# Patient Record
Sex: MALE | Race: WHITE | NOT HISPANIC OR LATINO | Employment: OTHER | URBAN - METROPOLITAN AREA
[De-identification: names, ages, dates, MRNs, and addresses within clinical notes are randomized per-mention and may not be internally consistent; named-entity substitution may affect disease eponyms.]

---

## 2019-07-25 ENCOUNTER — OFFICE VISIT (OUTPATIENT)
Dept: PODIATRY | Facility: CLINIC | Age: 64
End: 2019-07-25
Payer: COMMERCIAL

## 2019-07-25 VITALS
RESPIRATION RATE: 18 BRPM | HEIGHT: 70 IN | SYSTOLIC BLOOD PRESSURE: 161 MMHG | OXYGEN SATURATION: 98 % | DIASTOLIC BLOOD PRESSURE: 91 MMHG | BODY MASS INDEX: 27.92 KG/M2 | TEMPERATURE: 98 F | HEART RATE: 66 BPM | WEIGHT: 195 LBS

## 2019-07-25 DIAGNOSIS — B35.1 ONYCHOMYCOSIS DUE TO DERMATOPHYTE: ICD-10-CM

## 2019-07-25 DIAGNOSIS — L60.8 ACQUIRED DYSMORPHIC TOENAIL: Primary | ICD-10-CM

## 2019-07-25 PROCEDURE — 99999 PR PBB SHADOW E&M-NEW PATIENT-LVL III: CPT | Mod: PBBFAC,,, | Performed by: PODIATRIST

## 2019-07-25 PROCEDURE — 99999 PR PBB SHADOW E&M-NEW PATIENT-LVL III: ICD-10-PCS | Mod: PBBFAC,,, | Performed by: PODIATRIST

## 2019-07-25 PROCEDURE — 99202 PR OFFICE/OUTPT VISIT, NEW, LEVL II, 15-29 MIN: ICD-10-PCS | Mod: S$GLB,,, | Performed by: PODIATRIST

## 2019-07-25 PROCEDURE — 99202 OFFICE O/P NEW SF 15 MIN: CPT | Mod: S$GLB,,, | Performed by: PODIATRIST

## 2019-07-28 NOTE — PROGRESS NOTES
Subjective:      Patient ID: Kade Ames is a 63 y.o. male.    Chief Complaint: Nail Problem (fungus)  Patient presents with concern regarding a problem with the right great toenail.  Reports changes to the nail over the last 2-3 years, denies history of injury.  Reports pain on occasion depending on shoes, the nail is very damaged and inquires about having it removed.   Patient denies any previous history of ingrown nail or infection. He has noticed thickening and discoloration of the 3rd digit nail left foot.   Patient  Is hard of hearing,  Denies any any medical or health issues, denies taking any prescription medications    ROS  Constitutional    Pleasant, well-nourished, no distress, well oriented, HARD OF HEARING     Cardiovascular          No chest pain, no shortness of breath,  no swelling in the extremities    Respiratory         No cough, no congestion     Musculoskeletal        No muscle aches, no arthralgias/joint pain, no back pain    Neurologic         No weakness          Objective:      Physical Exam   Cardiovascular:   Pulses:       Dorsalis pedis pulses are 2+ on the right side, and 2+ on the left side.        Posterior tibial pulses are 2+ on the right side, and 2+ on the left side.   Musculoskeletal:        Right foot: There is no deformity.        Left foot: There is no deformity.   Feet:   Right Foot:   Protective Sensation: 3 sites tested. 3 sites sensed.   Skin Integrity: Negative for erythema.   Left Foot:   Protective Sensation: 3 sites tested. 3 sites sensed.   Skin Integrity: Negative for erythema.   Vascular         Normal CFT bilateral   No lower extremity edema bilateral   Pedal skin temperature and color are normal bilateral     Integumentary   Right hallux nail is severely dystrophic, irregular, evidence of permanent damage to the nail bed.  Nail is short but thickened raised, discolored, partially detached.  Thickness reduced, no evidence of  subungual abscess or ingrown nail  No  pain upon palpation, no surrounding erythema or calor  Yellow, thick, fungal nail 3rd digit left foot, no pain, no sign of infection  Other nails are healthy, pink, clear  Skin is in good condition bilateral feet          Neurological   Gross sensation intact, no paresthesias bilateral feet     Musculoskeletal   Muscle Strength/Testing and Tone:  Intact, normal tone bilateral   Joints, Bones, and Muscles: Normal for age        Walks well unassisted        Presents in appropriate shoes        Assessment:       Encounter Diagnoses   Name Primary?    Acquired dysmorphic toenail Yes    Onychomycosis due to dermatophyte          Plan:       Kade was seen today for nail problem.    Diagnoses and all orders for this visit:    Acquired dysmorphic toenail    Onychomycosis due to dermatophyte      Explained to nail patient I would not recommend removing nail, it can cause further damage to the nail bed and most likely will grow in the same way if not worse.  Explained the only time I recommend removing partial or total nail is if it is ingrown and infected.  Discussed maintenance of nail, reducing thickness, keep nails short and thin.  Recommended daily treatment with either Vicks vapor rub or tea tree oil.  Soak once a week in warm water and Epson salt. Explained these topicals have been successful since they are absorbed into the nail which softens the nail to recruit leave pressure and pain.   Explained the patient topical treatment takes about 3 months to notice improvement, can take 12 months to grow out a full nail plate.  We discussed permanent damage of the nail bed, unlikely nail will grow in any better but these treatments can be very beneficial for reducing pain and prevent any ingrown nails and potential complications due to damage nail   We discussed signs of infection to monitor for and conservative treatments to start should an area become red, swollen or painful.  Advised any area which has not improved in  2-3 days should be seen for follow-up in the office.  Patient was in understanding and agreement with treatment plan  I counseled the patient on his conditions, their implications and medical management.  Instructed patient to contact the office with any changes, questions, concerns, worsening of symptoms. Patient verbalized understanding.   Total face to face time, exam, assessment, treatment, discussion, documentation 20 minutes, more than half this time spent on consultation and coordination of care.   Follow up as needed      This note was created using [employer] voice recognition software that occasionally misinterpreted phrases or words.

## 2020-10-20 ENCOUNTER — OFFICE VISIT (OUTPATIENT)
Dept: PODIATRY | Facility: CLINIC | Age: 65
End: 2020-10-20
Payer: COMMERCIAL

## 2020-10-20 VITALS
TEMPERATURE: 98 F | HEART RATE: 82 BPM | HEIGHT: 70 IN | OXYGEN SATURATION: 99 % | WEIGHT: 195 LBS | DIASTOLIC BLOOD PRESSURE: 81 MMHG | RESPIRATION RATE: 19 BRPM | SYSTOLIC BLOOD PRESSURE: 189 MMHG | BODY MASS INDEX: 27.92 KG/M2

## 2020-10-20 DIAGNOSIS — L60.8 ACQUIRED DYSMORPHIC TOENAIL: ICD-10-CM

## 2020-10-20 DIAGNOSIS — B35.1 ONYCHOMYCOSIS OF TOENAIL: ICD-10-CM

## 2020-10-20 DIAGNOSIS — Z79.899 HIGH RISK MEDICATION USE: ICD-10-CM

## 2020-10-20 DIAGNOSIS — M79.674 PAIN OF RIGHT GREAT TOE: Primary | ICD-10-CM

## 2020-10-20 PROCEDURE — 99999 PR PBB SHADOW E&M-EST. PATIENT-LVL III: CPT | Mod: PBBFAC,,, | Performed by: PODIATRIST

## 2020-10-20 PROCEDURE — 99214 PR OFFICE/OUTPT VISIT, EST, LEVL IV, 30-39 MIN: ICD-10-PCS | Mod: S$GLB,,, | Performed by: PODIATRIST

## 2020-10-20 PROCEDURE — 99214 OFFICE O/P EST MOD 30 MIN: CPT | Mod: S$GLB,,, | Performed by: PODIATRIST

## 2020-10-20 PROCEDURE — 99999 PR PBB SHADOW E&M-EST. PATIENT-LVL III: ICD-10-PCS | Mod: PBBFAC,,, | Performed by: PODIATRIST

## 2020-10-20 RX ORDER — TERAZOSIN 1 MG/1
CAPSULE ORAL
COMMUNITY
Start: 2020-10-05 | End: 2021-04-29

## 2020-10-20 RX ORDER — CLOTRIMAZOLE 1 G/ML
SOLUTION TOPICAL 2 TIMES DAILY
Qty: 30 BOTTLE | Refills: 1 | Status: SHIPPED | OUTPATIENT
Start: 2020-10-20 | End: 2021-08-11 | Stop reason: SDUPTHER

## 2020-10-20 RX ORDER — ASPIRIN 325 MG
325 TABLET ORAL DAILY
COMMUNITY

## 2020-10-20 RX ORDER — PRAVASTATIN SODIUM 40 MG/1
40 TABLET ORAL DAILY
COMMUNITY
Start: 2020-07-30 | End: 2021-04-29

## 2020-10-20 RX ORDER — CARVEDILOL 3.12 MG/1
3.12 TABLET ORAL DAILY
COMMUNITY
Start: 2020-10-06 | End: 2023-01-19 | Stop reason: SDUPTHER

## 2020-10-20 NOTE — LETTER
October 20, 2020      Donn Rodriges MD  [address]  [place] [address]           [facility]  [address] [facility] [place] [address]  Phone: [phone]  Fax: [phone]          Patient: Kade Ames   MR Number: [id number]   YOB: 1955   Date of Visit: 10/20/2020       Dear Dr. Donn Rodriges:    Thank you for referring Kade Ames to me for evaluation. Attached you will find relevant portions of my assessment and plan of care.    If you have questions, please do not hesitate to call me. I look forward to following Kade Ames along with you.    Sincerely,    Sonal Stone, JJ    Enclosure  CC:  No Recipients    If you would like to receive this communication electronically, please contact [email or web address] or [phone] to request more information on [employer] Link access.    For providers and/or their staff who would like to refer a patient to [facility], please contact us through our one-stop-shop provider referral line, [facility], at [phone].    If you feel you have received this communication in error or would no longer like to receive these types of communications, please e-mail [email or web address]

## 2020-10-21 NOTE — PROGRESS NOTES
Subjective:       Patient ID: Kade Ames is a 65 y.o. male.    Chief Complaint: Toe Pain  Patient presents with his son with complaint of pain right great toe.  Patient is deaf, reads lips.  His son removed his mask so he could relay information back to patient from myself.  We last saw patient over a year ago for painful nail right great toe.  Patient states over-the-counter topicals have not helped.  At this point the toe is so painful at times he finds himself walking differently to avoid pressure in this location.   States he is unable to trim it properly, he is not currently applying anything to the nail, has some old oral Lamisil left and inquires if he should take it.  Has similar condition 3rd digit nail left foot with no pain      Past Medical History:   Diagnosis Date    Deaf     reads lips    Diabetes mellitus, type 2      History reviewed. No pertinent surgical history.  History reviewed. No pertinent family history.  Social History     Socioeconomic History    Marital status:      Spouse name: Not on file    Number of children: Not on file    Years of education: Not on file    Highest education level: Not on file   Occupational History    Not on file   Social Needs    Financial resource strain: Not on file    Food insecurity     Worry: Not on file     Inability: Not on file    Transportation needs     Medical: Not on file     Non-medical: Not on file   Tobacco Use    Smoking status: Never Smoker    Smokeless tobacco: Never Used   Substance and Sexual Activity    Alcohol use: Never     Frequency: Never    Drug use: Never    Sexual activity: Not Currently   Lifestyle    Physical activity     Days per week: Not on file     Minutes per session: Not on file    Stress: Not on file   Relationships    Social connections     Talks on phone: Not on file     Gets together: Not on file     Attends Zoroastrian service: Not on file     Active member of club or organization: Not on file      "Attends meetings of clubs or organizations: Not on file     Relationship status: Not on file   Other Topics Concern    Not on file   Social History Narrative    Not on file       Current Outpatient Medications   Medication Sig Dispense Refill    aspirin 325 MG tablet Take 325 mg by mouth once daily.      carvediloL (COREG) 3.125 MG tablet Take 3.125 mg by mouth once daily.      pravastatin (PRAVACHOL) 40 MG tablet Take 40 mg by mouth once daily.      terazosin (HYTRIN) 1 MG capsule TAKE 1 CAPSULE BY MOUTH EVERY DAY IN THE EVENING      clotrimazole (LOTRIMIN) 1 % Soln Apply topically 2 (two) times daily. 30 Bottle 1     No current facility-administered medications for this visit.      Review of patient's allergies indicates:  No Known Allergies    Review of Systems   Constitutional: Negative for fever.   HENT: Negative for congestion.    Respiratory: Negative for cough.    Cardiovascular: Negative for leg swelling.   Musculoskeletal: Negative for gait problem.   All other systems reviewed and are negative.      Objective:      Vitals:    10/20/20 1022   BP: (!) 189/81   Pulse: 82   Resp: 19   Temp: 97.8 °F (36.6 °C)   TempSrc: Temporal   SpO2: 99%   Weight: 88.5 kg (195 lb)   Height: 5' 10" (1.778 m)     Physical Exam  Vitals signs and nursing note reviewed.   Constitutional:       General: He is not in acute distress.  Cardiovascular:      Pulses:           Dorsalis pedis pulses are 2+ on the right side and 2+ on the left side.        Posterior tibial pulses are 2+ on the right side and 2+ on the left side.   Pulmonary:      Effort: Pulmonary effort is normal.   Feet:      Right foot:      Skin integrity: No erythema.      Toenail Condition: Right toenails are abnormally thick. Fungal disease present.     Left foot:      Skin integrity: No erythema.      Toenail Condition: Fungal disease present.  Skin:     Capillary Refill: Capillary refill takes 2 to 3 seconds.   Neurological:      General: No focal deficit " present.   Psychiatric:         Mood and Affect: Mood normal.         Behavior: Behavior normal.       Vascular         Normal CFT bilateral   No lower extremity edema bilateral   Pedal skin temperature and color are normal bilateral     Integumentary   Tender, raised, tented, discolored and very dystrophic onychomycotic right hallux nail.  There is no evidence of ingrown nail upon reducing thickness of this nail plate.  No edema, erythema or calor.        Onychomycosis 3rd digit nail left foot, no pain  All other nails are clear, flat, healthy with no pain  Soft skin texture with no complications    Neurological   Gross sensation intact, no paresthesias bilateral feet     Musculoskeletal   Muscle Strength/Testing and Tone:  Intact, normal tone bilateral   Joints, Bones, and Muscles: Normal with normal ROM        Walks well unassisted        Presents in appropriate shoes       Assessment:       1. Pain of right great toe    2. Acquired dysmorphic toenail - Right Foot    3. Onychomycosis of toenail    4. High risk medication use        Plan:           CLOTRIMAZOLE TOPICAL SOLUTION APPLY TWICE DAILY TO AFFECTED NAILS    HEPATIC FUNCTION FUNCTION PANEL, EXTERNAL SITE      Had a lengthy discussion with patient about possibilities on how he acquired too damaged, fungal nails on all the rest are healthy.  Explained the patient typically occurs due to trauma to the nail / nail plate/toe or toenail.  Advised patient is not always an event which is recollected, it can occur slowly over time.  Explained at the very least this nail needs to be better maintained, thickness reduced on a regular basis, and topical medication used.  Right hallux nail was debrided significantly.  Due to tented nail in the center, small spot blood was noted.  Remaining portion of the nail was reduced significantly to thin the nail plate.  Area cleansed with peroxide, triple antibiotic ointment, gauze and Coban applied.  Instructed patient to clean  nail once daily with peroxide, no antibiotic ointment or bandages needed unless the area is sore.  Contact the office if any redness, swelling or pain develops.  At the end of one-week the area should be completely healed and he can start applying prescribed topical antifungal medication.  It is to be applied twice daily.  Instructed patient to soak few times weekly and warm water and Epson salt, at least once a week reduce thickness of nail with a file or emery board to prevent buildup, avoid pain, and while medication to penetrate more effectively  Patient does want to pursue oral Lamisil.  We discussed this medication at length,  expectations regarding a healthy nail which may not be possible if there is permanent damage, and we discussed metabolism by the liver therefore need for blood work.  Patient requested order for blood work so he could have this test performed in addition to other blood work he will be having through his PCP in the next week or 2  Instructed patient to contact office with any questions or concerns  Patient/family were in understanding and agreement with treatment plan.  I counseled the patient on their conditions, implications and medical management.  Instructed patient/family to contact the office with any changes, questions, concerns, worsening of symptoms.   Total face to face time, exam, assessment, treatment, discussion, documentation 25 minutes, more than half this time spent on consultation and coordination of care.   Follow up 3 months    This note was created using [employer] voice recognition software that occasionally misinterpreted phrases or words.

## 2021-01-26 ENCOUNTER — OFFICE VISIT (OUTPATIENT)
Dept: PODIATRY | Facility: CLINIC | Age: 66
End: 2021-01-26
Payer: COMMERCIAL

## 2021-01-26 VITALS
WEIGHT: 195 LBS | BODY MASS INDEX: 27.92 KG/M2 | HEIGHT: 70 IN | HEART RATE: 100 BPM | TEMPERATURE: 98 F | SYSTOLIC BLOOD PRESSURE: 189 MMHG | OXYGEN SATURATION: 96 % | DIASTOLIC BLOOD PRESSURE: 96 MMHG

## 2021-01-26 DIAGNOSIS — Z79.899 HIGH RISK MEDICATION USE: ICD-10-CM

## 2021-01-26 DIAGNOSIS — B35.1 ONYCHOMYCOSIS OF TOENAIL: ICD-10-CM

## 2021-01-26 DIAGNOSIS — L60.8 ACQUIRED DYSMORPHIC TOENAIL: ICD-10-CM

## 2021-01-26 DIAGNOSIS — M79.674 PAIN OF RIGHT GREAT TOE: Primary | ICD-10-CM

## 2021-01-26 PROCEDURE — 99214 PR OFFICE/OUTPT VISIT, EST, LEVL IV, 30-39 MIN: ICD-10-PCS | Mod: S$GLB,,, | Performed by: PODIATRIST

## 2021-01-26 PROCEDURE — 99214 OFFICE O/P EST MOD 30 MIN: CPT | Mod: S$GLB,,, | Performed by: PODIATRIST

## 2021-01-26 PROCEDURE — 99999 PR PBB SHADOW E&M-EST. PATIENT-LVL III: CPT | Mod: PBBFAC,,, | Performed by: PODIATRIST

## 2021-01-26 PROCEDURE — 99999 PR PBB SHADOW E&M-EST. PATIENT-LVL III: ICD-10-PCS | Mod: PBBFAC,,, | Performed by: PODIATRIST

## 2021-01-26 RX ORDER — CEPHALEXIN 250 MG/1
1 CAPSULE ORAL 2 TIMES DAILY
COMMUNITY
Start: 2021-01-04 | End: 2023-01-18

## 2021-02-01 ENCOUNTER — TELEPHONE (OUTPATIENT)
Dept: PODIATRY | Facility: CLINIC | Age: 66
End: 2021-02-01

## 2021-02-04 ENCOUNTER — TELEPHONE (OUTPATIENT)
Dept: PODIATRY | Facility: CLINIC | Age: 66
End: 2021-02-04

## 2021-02-05 ENCOUNTER — TELEPHONE (OUTPATIENT)
Dept: PODIATRY | Facility: CLINIC | Age: 66
End: 2021-02-05

## 2021-03-02 ENCOUNTER — TELEPHONE (OUTPATIENT)
Dept: PODIATRY | Facility: CLINIC | Age: 66
End: 2021-03-02

## 2021-03-04 DIAGNOSIS — Z79.899 HIGH RISK MEDICATION USE: ICD-10-CM

## 2021-03-04 DIAGNOSIS — M79.674 PAIN OF RIGHT GREAT TOE: ICD-10-CM

## 2021-03-04 DIAGNOSIS — B35.1 ONYCHOMYCOSIS DUE TO DERMATOPHYTE: ICD-10-CM

## 2021-03-04 DIAGNOSIS — L60.8 ACQUIRED DYSMORPHIC TOENAIL: Primary | ICD-10-CM

## 2021-03-04 RX ORDER — TERBINAFINE HYDROCHLORIDE 250 MG/1
250 TABLET ORAL DAILY
Qty: 30 TABLET | Refills: 1 | Status: SHIPPED | OUTPATIENT
Start: 2021-03-04 | End: 2021-04-18

## 2021-03-09 ENCOUNTER — OFFICE VISIT (OUTPATIENT)
Dept: FAMILY MEDICINE | Facility: CLINIC | Age: 66
End: 2021-03-09
Payer: MEDICARE

## 2021-03-09 VITALS
WEIGHT: 216 LBS | DIASTOLIC BLOOD PRESSURE: 90 MMHG | OXYGEN SATURATION: 95 % | HEART RATE: 74 BPM | BODY MASS INDEX: 30.92 KG/M2 | SYSTOLIC BLOOD PRESSURE: 142 MMHG | HEIGHT: 70 IN | TEMPERATURE: 100 F

## 2021-03-09 DIAGNOSIS — B35.1 TOENAIL FUNGUS: ICD-10-CM

## 2021-03-09 DIAGNOSIS — R29.740: ICD-10-CM

## 2021-03-09 DIAGNOSIS — H91.90 DEAFNESS, UNSPECIFIED LATERALITY: ICD-10-CM

## 2021-03-09 DIAGNOSIS — I10 ESSENTIAL HYPERTENSION, BENIGN: ICD-10-CM

## 2021-03-09 DIAGNOSIS — K21.9 GASTROESOPHAGEAL REFLUX DISEASE WITHOUT ESOPHAGITIS: ICD-10-CM

## 2021-03-09 DIAGNOSIS — E78.5 HYPERLIPIDEMIA, UNSPECIFIED HYPERLIPIDEMIA TYPE: Primary | ICD-10-CM

## 2021-03-09 PROCEDURE — 99214 OFFICE O/P EST MOD 30 MIN: CPT | Mod: S$GLB,,, | Performed by: FAMILY MEDICINE

## 2021-03-09 PROCEDURE — 99214 PR OFFICE/OUTPT VISIT, EST, LEVL IV, 30-39 MIN: ICD-10-PCS | Mod: S$GLB,,, | Performed by: FAMILY MEDICINE

## 2021-04-29 ENCOUNTER — OFFICE VISIT (OUTPATIENT)
Dept: PODIATRY | Facility: CLINIC | Age: 66
End: 2021-04-29
Payer: COMMERCIAL

## 2021-04-29 VITALS
HEART RATE: 72 BPM | SYSTOLIC BLOOD PRESSURE: 154 MMHG | DIASTOLIC BLOOD PRESSURE: 87 MMHG | HEIGHT: 70 IN | WEIGHT: 205 LBS | RESPIRATION RATE: 18 BRPM | TEMPERATURE: 98 F | BODY MASS INDEX: 29.35 KG/M2

## 2021-04-29 DIAGNOSIS — B35.1 ONYCHOMYCOSIS OF TOENAIL: ICD-10-CM

## 2021-04-29 DIAGNOSIS — L60.8 ACQUIRED DYSMORPHIC TOENAIL: ICD-10-CM

## 2021-04-29 DIAGNOSIS — M79.674 PAIN OF RIGHT GREAT TOE: Primary | ICD-10-CM

## 2021-04-29 PROCEDURE — 99213 PR OFFICE/OUTPT VISIT, EST, LEVL III, 20-29 MIN: ICD-10-PCS | Mod: S$GLB,,, | Performed by: PODIATRIST

## 2021-04-29 PROCEDURE — 99213 OFFICE O/P EST LOW 20 MIN: CPT | Mod: S$GLB,,, | Performed by: PODIATRIST

## 2021-04-29 PROCEDURE — 99999 PR PBB SHADOW E&M-EST. PATIENT-LVL IV: ICD-10-PCS | Mod: PBBFAC,,, | Performed by: PODIATRIST

## 2021-04-29 PROCEDURE — 99999 PR PBB SHADOW E&M-EST. PATIENT-LVL IV: CPT | Mod: PBBFAC,,, | Performed by: PODIATRIST

## 2021-04-29 RX ORDER — TERBINAFINE HYDROCHLORIDE 250 MG/1
250 TABLET ORAL DAILY
Qty: 30 TABLET | Refills: 0 | Status: SHIPPED | OUTPATIENT
Start: 2021-04-29 | End: 2021-05-29

## 2021-04-29 RX ORDER — TERBINAFINE HYDROCHLORIDE 250 MG/1
250 TABLET ORAL DAILY
COMMUNITY
End: 2021-04-29 | Stop reason: SDUPTHER

## 2021-05-05 DIAGNOSIS — Z11.59 NEED FOR HEPATITIS C SCREENING TEST: ICD-10-CM

## 2021-05-06 ENCOUNTER — TELEPHONE (OUTPATIENT)
Dept: PODIATRY | Facility: CLINIC | Age: 66
End: 2021-05-06

## 2021-05-06 ENCOUNTER — OFFICE VISIT (OUTPATIENT)
Dept: PODIATRY | Facility: CLINIC | Age: 66
End: 2021-05-06
Payer: COMMERCIAL

## 2021-05-06 VITALS
SYSTOLIC BLOOD PRESSURE: 156 MMHG | BODY MASS INDEX: 29.35 KG/M2 | TEMPERATURE: 97 F | HEART RATE: 81 BPM | DIASTOLIC BLOOD PRESSURE: 89 MMHG | HEIGHT: 70 IN | WEIGHT: 205 LBS

## 2021-05-06 DIAGNOSIS — L60.8 ACQUIRED DYSMORPHIC TOENAIL: Primary | ICD-10-CM

## 2021-05-06 DIAGNOSIS — B35.1 ONYCHOMYCOSIS DUE TO DERMATOPHYTE: ICD-10-CM

## 2021-05-06 DIAGNOSIS — M79.674 PAIN OF RIGHT GREAT TOE: ICD-10-CM

## 2021-05-06 DIAGNOSIS — B35.1 ONYCHOMYCOSIS OF TOENAIL: ICD-10-CM

## 2021-05-06 PROCEDURE — 11730 NAIL REMOVAL: ICD-10-PCS | Mod: T5,S$GLB,, | Performed by: PODIATRIST

## 2021-05-06 PROCEDURE — 99999 PR PBB SHADOW E&M-EST. PATIENT-LVL III: ICD-10-PCS | Mod: PBBFAC,,, | Performed by: PODIATRIST

## 2021-05-06 PROCEDURE — 99999 PR PBB SHADOW E&M-EST. PATIENT-LVL III: CPT | Mod: PBBFAC,,, | Performed by: PODIATRIST

## 2021-05-06 PROCEDURE — 99213 OFFICE O/P EST LOW 20 MIN: CPT | Mod: 25,S$GLB,, | Performed by: PODIATRIST

## 2021-05-06 PROCEDURE — 11730 AVULSION NAIL PLATE SIMPLE 1: CPT | Mod: T5,S$GLB,, | Performed by: PODIATRIST

## 2021-05-06 PROCEDURE — 99213 PR OFFICE/OUTPT VISIT, EST, LEVL III, 20-29 MIN: ICD-10-PCS | Mod: 25,S$GLB,, | Performed by: PODIATRIST

## 2021-05-06 RX ORDER — HYDROCODONE BITARTRATE AND ACETAMINOPHEN 7.5; 325 MG/1; MG/1
1 TABLET ORAL EVERY 6 HOURS PRN
Qty: 12 TABLET | Refills: 0 | Status: SHIPPED | OUTPATIENT
Start: 2021-05-06 | End: 2021-05-09

## 2021-05-06 RX ORDER — HYDROCODONE BITARTRATE AND ACETAMINOPHEN 7.5; 325 MG/1; MG/1
1 TABLET ORAL EVERY 6 HOURS PRN
Qty: 12 TABLET | Refills: 0 | Status: SHIPPED | OUTPATIENT
Start: 2021-05-06 | End: 2021-05-06 | Stop reason: SDUPTHER

## 2021-05-12 ENCOUNTER — OFFICE VISIT (OUTPATIENT)
Dept: PODIATRY | Facility: CLINIC | Age: 66
End: 2021-05-12
Payer: COMMERCIAL

## 2021-05-12 VITALS
DIASTOLIC BLOOD PRESSURE: 90 MMHG | RESPIRATION RATE: 16 BRPM | BODY MASS INDEX: 29.94 KG/M2 | WEIGHT: 209.13 LBS | SYSTOLIC BLOOD PRESSURE: 150 MMHG | HEIGHT: 70 IN

## 2021-05-12 DIAGNOSIS — M20.5X1 HALLUX LIMITUS OF RIGHT FOOT: ICD-10-CM

## 2021-05-12 DIAGNOSIS — L60.8 ACQUIRED DYSMORPHIC TOENAIL: Primary | ICD-10-CM

## 2021-05-12 PROCEDURE — 99213 PR OFFICE/OUTPT VISIT, EST, LEVL III, 20-29 MIN: ICD-10-PCS | Mod: S$GLB,,, | Performed by: PODIATRIST

## 2021-05-12 PROCEDURE — 99999 PR PBB SHADOW E&M-EST. PATIENT-LVL III: CPT | Mod: PBBFAC,,, | Performed by: PODIATRIST

## 2021-05-12 PROCEDURE — 99213 OFFICE O/P EST LOW 20 MIN: CPT | Mod: S$GLB,,, | Performed by: PODIATRIST

## 2021-05-12 PROCEDURE — 99999 PR PBB SHADOW E&M-EST. PATIENT-LVL III: ICD-10-PCS | Mod: PBBFAC,,, | Performed by: PODIATRIST

## 2021-06-09 ENCOUNTER — OFFICE VISIT (OUTPATIENT)
Dept: PODIATRY | Facility: CLINIC | Age: 66
End: 2021-06-09
Payer: COMMERCIAL

## 2021-06-09 VITALS
BODY MASS INDEX: 29.76 KG/M2 | SYSTOLIC BLOOD PRESSURE: 156 MMHG | HEIGHT: 70 IN | OXYGEN SATURATION: 97 % | RESPIRATION RATE: 16 BRPM | WEIGHT: 207.88 LBS | HEART RATE: 76 BPM | DIASTOLIC BLOOD PRESSURE: 88 MMHG

## 2021-06-09 DIAGNOSIS — L60.8 ACQUIRED DYSMORPHIC TOENAIL: Primary | ICD-10-CM

## 2021-06-09 PROCEDURE — 99999 PR PBB SHADOW E&M-EST. PATIENT-LVL IV: CPT | Mod: PBBFAC,,, | Performed by: PODIATRIST

## 2021-06-09 PROCEDURE — 99213 PR OFFICE/OUTPT VISIT, EST, LEVL III, 20-29 MIN: ICD-10-PCS | Mod: S$GLB,,, | Performed by: PODIATRIST

## 2021-06-09 PROCEDURE — 99213 OFFICE O/P EST LOW 20 MIN: CPT | Mod: S$GLB,,, | Performed by: PODIATRIST

## 2021-06-09 PROCEDURE — 99999 PR PBB SHADOW E&M-EST. PATIENT-LVL IV: ICD-10-PCS | Mod: PBBFAC,,, | Performed by: PODIATRIST

## 2021-08-11 ENCOUNTER — OFFICE VISIT (OUTPATIENT)
Dept: PODIATRY | Facility: CLINIC | Age: 66
End: 2021-08-11
Payer: COMMERCIAL

## 2021-08-11 VITALS
WEIGHT: 207.88 LBS | RESPIRATION RATE: 19 BRPM | SYSTOLIC BLOOD PRESSURE: 188 MMHG | BODY MASS INDEX: 29.76 KG/M2 | HEIGHT: 70 IN | OXYGEN SATURATION: 97 % | HEART RATE: 70 BPM | DIASTOLIC BLOOD PRESSURE: 97 MMHG

## 2021-08-11 DIAGNOSIS — B35.1 ONYCHOMYCOSIS OF TOENAIL: ICD-10-CM

## 2021-08-11 DIAGNOSIS — L60.8 ACQUIRED DYSMORPHIC TOENAIL: Primary | ICD-10-CM

## 2021-08-11 PROCEDURE — 99999 PR PBB SHADOW E&M-EST. PATIENT-LVL IV: CPT | Mod: PBBFAC,,, | Performed by: PODIATRIST

## 2021-08-11 PROCEDURE — 99213 OFFICE O/P EST LOW 20 MIN: CPT | Mod: S$GLB,,, | Performed by: PODIATRIST

## 2021-08-11 PROCEDURE — 99999 PR PBB SHADOW E&M-EST. PATIENT-LVL IV: ICD-10-PCS | Mod: PBBFAC,,, | Performed by: PODIATRIST

## 2021-08-11 PROCEDURE — 99213 PR OFFICE/OUTPT VISIT, EST, LEVL III, 20-29 MIN: ICD-10-PCS | Mod: S$GLB,,, | Performed by: PODIATRIST

## 2021-08-11 RX ORDER — CLOTRIMAZOLE 1 G/ML
SOLUTION TOPICAL 2 TIMES DAILY
Qty: 30 BOTTLE | Refills: 1 | Status: SHIPPED | OUTPATIENT
Start: 2021-08-11 | End: 2021-08-11 | Stop reason: SDUPTHER

## 2021-08-11 RX ORDER — CLOTRIMAZOLE 1 G/ML
SOLUTION TOPICAL 2 TIMES DAILY
Qty: 30 BOTTLE | Refills: 1 | Status: SHIPPED | OUTPATIENT
Start: 2021-08-11 | End: 2023-01-18

## 2021-12-02 DIAGNOSIS — Z12.11 COLON CANCER SCREENING: ICD-10-CM

## 2022-04-21 ENCOUNTER — TELEPHONE (OUTPATIENT)
Dept: FAMILY MEDICINE | Facility: CLINIC | Age: 67
End: 2022-04-21
Payer: COMMERCIAL

## 2022-05-31 ENCOUNTER — PATIENT MESSAGE (OUTPATIENT)
Dept: ADMINISTRATIVE | Facility: HOSPITAL | Age: 67
End: 2022-05-31
Payer: COMMERCIAL

## 2022-06-02 ENCOUNTER — TELEPHONE (OUTPATIENT)
Dept: FAMILY MEDICINE | Facility: CLINIC | Age: 67
End: 2022-06-02
Payer: COMMERCIAL

## 2023-01-06 ENCOUNTER — OFFICE VISIT (OUTPATIENT)
Dept: FAMILY MEDICINE | Facility: CLINIC | Age: 68
End: 2023-01-06
Payer: COMMERCIAL

## 2023-01-06 VITALS
SYSTOLIC BLOOD PRESSURE: 172 MMHG | RESPIRATION RATE: 20 BRPM | BODY MASS INDEX: 31.35 KG/M2 | HEART RATE: 88 BPM | WEIGHT: 219 LBS | OXYGEN SATURATION: 97 % | DIASTOLIC BLOOD PRESSURE: 92 MMHG | TEMPERATURE: 98 F | HEIGHT: 70 IN

## 2023-01-06 DIAGNOSIS — H91.90 DEAFNESS, UNSPECIFIED LATERALITY: ICD-10-CM

## 2023-01-06 DIAGNOSIS — H65.193 OTHER ACUTE NONSUPPURATIVE OTITIS MEDIA OF BOTH EARS, RECURRENCE NOT SPECIFIED: ICD-10-CM

## 2023-01-06 DIAGNOSIS — H65.03 OTITIS MEDIA, SEROUS, ACUTE, WITHOUT RUPTURE, BILATERAL: ICD-10-CM

## 2023-01-06 DIAGNOSIS — M19.90 OSTEOARTHRITIS, UNSPECIFIED OSTEOARTHRITIS TYPE, UNSPECIFIED SITE: Primary | ICD-10-CM

## 2023-01-06 DIAGNOSIS — I10 HYPERTENSION, UNSPECIFIED TYPE: ICD-10-CM

## 2023-01-06 PROCEDURE — 99214 PR OFFICE/OUTPT VISIT, EST, LEVL IV, 30-39 MIN: ICD-10-PCS | Mod: S$GLB,,, | Performed by: NURSE PRACTITIONER

## 2023-01-06 PROCEDURE — 99214 OFFICE O/P EST MOD 30 MIN: CPT | Mod: S$GLB,,, | Performed by: NURSE PRACTITIONER

## 2023-01-06 RX ORDER — MOMETASONE FUROATE 50 UG/1
2 SPRAY, METERED NASAL DAILY
Qty: 17 G | Refills: 3 | Status: SHIPPED | OUTPATIENT
Start: 2023-01-06 | End: 2023-08-23

## 2023-01-06 RX ORDER — LOSARTAN POTASSIUM 25 MG/1
25 TABLET ORAL 2 TIMES DAILY
Qty: 180 TABLET | Refills: 3 | Status: SHIPPED | OUTPATIENT
Start: 2023-01-06 | End: 2024-01-06

## 2023-01-06 RX ORDER — NEOMYCIN SULFATE, POLYMYXIN B SULFATE AND HYDROCORTISONE 10; 3.5; 1 MG/ML; MG/ML; [USP'U]/ML
3 SUSPENSION/ DROPS AURICULAR (OTIC) 3 TIMES DAILY
Qty: 10 ML | Refills: 1 | Status: SHIPPED | OUTPATIENT
Start: 2023-01-06 | End: 2023-01-19

## 2023-01-06 RX ORDER — DICLOFENAC SODIUM 10 MG/G
2 GEL TOPICAL 2 TIMES DAILY PRN
Qty: 50 G | Refills: 5 | Status: SHIPPED | OUTPATIENT
Start: 2023-01-06

## 2023-01-06 NOTE — PROGRESS NOTES
Subjective:    Patient ID: Kade is a 67 y.o. male.  Chief Complaint: Kade had concerns including Otalgia (R ear decrease hearing) and Arthritis (Bilateral Hands).   Narrative:   Mr. Ames presents for evaluation of elevated BP, ear discomfort. He wears hearing aids regularly, has some canal discomfort  Requests topical treatment for arthritis of the hands    Otalgia     Arthritis    All other systems negative except as stated above.        Review of patient's allergies indicates:  No Known Allergies  Objective:      Vitals:    01/06/23 1513   BP: (!) 172/92   Pulse:    Resp:    Temp:      -WEIGHT  Body mass index is 31.42 kg/m².  Physical Exam  Vitals and nursing note reviewed.   Constitutional:       Appearance: Normal appearance.   HENT:      Head: Normocephalic and atraumatic.      Ears:      Comments: Bilateral serous effusions,   Mild erythema of the auditory canals, without swelling.     Nose: Nose normal.      Mouth/Throat:      Mouth: Mucous membranes are moist.      Pharynx: Oropharynx is clear.   Eyes:      Conjunctiva/sclera: Conjunctivae normal.      Pupils: Pupils are equal, round, and reactive to light.   Cardiovascular:      Rate and Rhythm: Normal rate and regular rhythm.      Pulses: Normal pulses.      Heart sounds: Normal heart sounds.   Pulmonary:      Effort: Pulmonary effort is normal.      Breath sounds: Normal breath sounds.   Abdominal:      General: Abdomen is flat. Bowel sounds are normal.      Palpations: Abdomen is soft.   Musculoskeletal:      Cervical back: Normal range of motion and neck supple.      Comments: Has some mild arthritic changes of the hands, reports stiffness in am of distal fingers   Skin:     General: Skin is warm.      Capillary Refill: Capillary refill takes less than 2 seconds.   Neurological:      General: No focal deficit present.      Mental Status: He is alert.   Psychiatric:         Mood and Affect: Mood normal.         Behavior: Behavior normal.          Assessment:       1. Osteoarthritis, unspecified osteoarthritis type, unspecified site    2. Deafness, unspecified laterality    3. Hypertension, unspecified type    4. Otitis media, serous, acute, without rupture, bilateral    5. Other acute nonsuppurative otitis media of both ears, recurrence not specified          Plan:       Osteoarthritis, unspecified osteoarthritis type, unspecified site  -     diclofenac sodium (VOLTAREN) 1 % Gel; Apply 2 g topically 2 (two) times daily as needed (arthritis pain).  Dispense: 50 g; Refill: 5    Deafness, unspecified laterality    Hypertension, unspecified type    Otitis media, serous, acute, without rupture, bilateral    Other acute nonsuppurative otitis media of both ears, recurrence not specified    Other orders  -     losartan (COZAAR) 25 MG tablet; Take 1 tablet (25 mg total) by mouth 2 (two) times a day.  Dispense: 180 tablet; Refill: 3  -     mometasone (NASONEX) 50 mcg/actuation nasal spray; 2 sprays by Nasal route once daily.  Dispense: 17 g; Refill: 3  -     neomycin-polymyxin-hydrocortisone (CORTISPORIN) 3.5-10,000-1 mg/mL-unit/mL-% otic suspension; Place 3 drops into both ears 3 (three) times daily. For 7 days  Dispense: 10 mL; Refill: 1     Dry ear precautions.  Med as above.  Nurse visit 2w for BP, OV 1 mo

## 2023-01-13 DIAGNOSIS — I10 HYPERTENSION: ICD-10-CM

## 2023-01-18 ENCOUNTER — CLINICAL SUPPORT (OUTPATIENT)
Dept: FAMILY MEDICINE | Facility: CLINIC | Age: 68
End: 2023-01-18
Payer: COMMERCIAL

## 2023-01-18 VITALS
BODY MASS INDEX: 30.99 KG/M2 | DIASTOLIC BLOOD PRESSURE: 100 MMHG | OXYGEN SATURATION: 98 % | HEART RATE: 81 BPM | SYSTOLIC BLOOD PRESSURE: 180 MMHG | WEIGHT: 216 LBS | TEMPERATURE: 98 F

## 2023-01-18 DIAGNOSIS — H91.90 DEAFNESS, UNSPECIFIED LATERALITY: ICD-10-CM

## 2023-01-18 DIAGNOSIS — I10 HYPERTENSION, UNSPECIFIED TYPE: Primary | ICD-10-CM

## 2023-01-18 PROCEDURE — 99213 OFFICE O/P EST LOW 20 MIN: CPT | Mod: S$GLB,,, | Performed by: NURSE PRACTITIONER

## 2023-01-18 PROCEDURE — 99213 PR OFFICE/OUTPT VISIT, EST, LEVL III, 20-29 MIN: ICD-10-PCS | Mod: S$GLB,,, | Performed by: NURSE PRACTITIONER

## 2023-01-18 NOTE — PROGRESS NOTES
Subjective:    Patient ID: Kade is a 67 y.o. male.  Chief Complaint: Kade had concerns including Medication Refill (Blood pressure check ).   Narrative:   Mr. Ames presents to discuss his BP, has been high in the mornings. Evening readings are better.  He has his home cuff today.  Denies any SOB or CP    Medication Refill    All other systems negative except as stated above.        Review of patient's allergies indicates:  No Known Allergies  Objective:      Vitals:    01/18/23 1133   BP: (!) 180/100   Pulse: 81   Temp: 98 °F (36.7 °C)     -WEIGHT  Body mass index is 30.99 kg/m².  Physical Exam  Vitals and nursing note reviewed.   Constitutional:       Appearance: Normal appearance.   HENT:      Head: Normocephalic and atraumatic.      Right Ear: Tympanic membrane normal.      Left Ear: Tympanic membrane normal.      Ears:      Comments: Right auditory canal is mildly inflames, nonedematous  Wears hearing aids     Nose: Nose normal.      Mouth/Throat:      Mouth: Mucous membranes are moist.      Pharynx: Oropharynx is clear.   Eyes:      Conjunctiva/sclera: Conjunctivae normal.      Pupils: Pupils are equal, round, and reactive to light.   Cardiovascular:      Rate and Rhythm: Normal rate and regular rhythm.      Pulses: Normal pulses.      Heart sounds: Normal heart sounds.   Pulmonary:      Effort: Pulmonary effort is normal.      Breath sounds: Normal breath sounds.   Abdominal:      General: Abdomen is flat. Bowel sounds are normal.      Palpations: Abdomen is soft.   Musculoskeletal:         General: Normal range of motion.      Cervical back: Normal range of motion and neck supple.   Skin:     General: Skin is warm.      Capillary Refill: Capillary refill takes less than 2 seconds.   Neurological:      General: No focal deficit present.      Mental Status: He is alert and oriented to person, place, and time.   Psychiatric:         Mood and Affect: Mood normal.         Assessment:       1. Hypertension,  unspecified type    2. Deafness, unspecified laterality          Plan:       Hypertension, unspecified type    Deafness, unspecified laterality     Recommend he take his carvedilol BID. Long discussion re: etiology of HTN, has had extensive workup in the past including ?cath, stress tests, all reported as normal.  Labs are normal.    Monitor his readings carefully. He continues to see cardiology, Dr. Hightower.  Followup in 1 mo.

## 2023-01-19 RX ORDER — NEOMYCIN SULFATE, POLYMYXIN B SULFATE AND HYDROCORTISONE 10; 3.5; 1 MG/ML; MG/ML; [USP'U]/ML
3 SUSPENSION/ DROPS AURICULAR (OTIC) 4 TIMES DAILY
Qty: 10 ML | Refills: 1 | Status: SHIPPED | OUTPATIENT
Start: 2023-01-19 | End: 2023-08-23

## 2023-01-19 RX ORDER — CARVEDILOL 3.12 MG/1
3.12 TABLET ORAL 2 TIMES DAILY
Qty: 180 TABLET | Refills: 1 | Status: SHIPPED | OUTPATIENT
Start: 2023-01-19 | End: 2023-07-21

## 2023-02-22 ENCOUNTER — OFFICE VISIT (OUTPATIENT)
Dept: FAMILY MEDICINE | Facility: CLINIC | Age: 68
End: 2023-02-22
Payer: COMMERCIAL

## 2023-02-22 VITALS
DIASTOLIC BLOOD PRESSURE: 76 MMHG | TEMPERATURE: 98 F | BODY MASS INDEX: 31.57 KG/M2 | SYSTOLIC BLOOD PRESSURE: 134 MMHG | HEART RATE: 80 BPM | WEIGHT: 220 LBS

## 2023-02-22 DIAGNOSIS — I10 HYPERTENSION, UNSPECIFIED TYPE: Primary | ICD-10-CM

## 2023-02-22 DIAGNOSIS — E78.5 HYPERLIPIDEMIA, UNSPECIFIED HYPERLIPIDEMIA TYPE: ICD-10-CM

## 2023-02-22 DIAGNOSIS — M19.90 OSTEOARTHRITIS, UNSPECIFIED OSTEOARTHRITIS TYPE, UNSPECIFIED SITE: ICD-10-CM

## 2023-02-22 DIAGNOSIS — H91.90 DEAFNESS, UNSPECIFIED LATERALITY: ICD-10-CM

## 2023-02-22 DIAGNOSIS — H65.193 OTHER ACUTE NONSUPPURATIVE OTITIS MEDIA OF BOTH EARS, RECURRENCE NOT SPECIFIED: ICD-10-CM

## 2023-02-22 PROCEDURE — 99213 PR OFFICE/OUTPT VISIT, EST, LEVL III, 20-29 MIN: ICD-10-PCS | Mod: S$GLB,,, | Performed by: NURSE PRACTITIONER

## 2023-02-22 PROCEDURE — 99213 OFFICE O/P EST LOW 20 MIN: CPT | Mod: S$GLB,,, | Performed by: NURSE PRACTITIONER

## 2023-02-22 RX ORDER — IBUPROFEN 800 MG/1
800 TABLET ORAL DAILY PRN
Qty: 60 TABLET | Refills: 1 | Status: SHIPPED | OUTPATIENT
Start: 2023-02-22

## 2023-02-22 NOTE — PROGRESS NOTES
Subjective:    Patient ID: Kade is a 67 y.o. male.  Chief Complaint: Kade had concerns including Follow-up (B/p and sinus issues).   Narrative:   Mr. Ames presents for followup on BP readings. States that BP lower. Tolerating medication well.  +sinus issues  Chronic deafness, ear problems    All other systems negative except as stated above.        Review of patient's allergies indicates:  No Known Allergies  Objective:      Vitals:    02/22/23 1006   BP: 134/76   Pulse: 80   Temp: 98 °F (36.7 °C)     -WEIGHT  Body mass index is 31.57 kg/m².  Physical Exam  Vitals and nursing note reviewed.   Constitutional:       Appearance: Normal appearance.   HENT:      Head: Normocephalic and atraumatic.      Right Ear: Tympanic membrane normal.      Left Ear: Tympanic membrane normal.      Ears:      Comments: Hearing impaired, wears hearing aids     Nose: Nose normal.      Mouth/Throat:      Mouth: Mucous membranes are moist.      Pharynx: Oropharynx is clear.   Eyes:      Conjunctiva/sclera: Conjunctivae normal.      Pupils: Pupils are equal, round, and reactive to light.   Cardiovascular:      Rate and Rhythm: Normal rate and regular rhythm.      Pulses: Normal pulses.      Heart sounds: Normal heart sounds.   Pulmonary:      Effort: Pulmonary effort is normal.      Breath sounds: Normal breath sounds.   Abdominal:      General: Abdomen is flat. Bowel sounds are normal.      Palpations: Abdomen is soft.   Musculoskeletal:         General: Normal range of motion.      Cervical back: Normal range of motion and neck supple.   Skin:     General: Skin is warm.      Capillary Refill: Capillary refill takes less than 2 seconds.   Neurological:      General: No focal deficit present.      Mental Status: He is alert.   Psychiatric:         Mood and Affect: Mood normal.         Assessment:       1. Hypertension, unspecified type    2. Deafness, unspecified laterality    3. Other acute nonsuppurative otitis media of both ears,  recurrence not specified          Plan:       Hypertension, unspecified type    Deafness, unspecified laterality    Other acute nonsuppurative otitis media of both ears, recurrence not specified       Continue mucinex for ear and sinus congestion.  May take OTC antihistamines or nasal corticosteroids.  Continue current meds, BP stable   Followup in 3 mo or PRN  No results found for: WBC, HGB, HCT, PLT, CHOL, TRIG, HDL, LDLDIRECT, ALT, AST, NA, K, CL, CREATININE, BUN, CO2, TSH, PSA, INR, GLUF, HGBA1C, MICROALBUR

## 2023-02-22 NOTE — PROGRESS NOTES
Subjective:    Patient ID: Kade is a 67 y.o. male.  Chief Complaint: Kade had no chief complaint listed for this encounter.   Narrative:   Mr. Ames presents for followup on BP, has had improved home BP readings. Reports nonspecific fatigue, unsure if he has had some flare of asthma.  Has hx cardiac workup about 2 years ago, was normal.    All other systems negative except as stated above.        Review of patient's allergies indicates:  No Known Allergies  Objective:      There were no vitals filed for this visit.  -WEIGHT  There is no height or weight on file to calculate BMI.  Physical Exam  Vitals and nursing note reviewed.   Constitutional:       Appearance: Normal appearance.   HENT:      Head: Normocephalic and atraumatic.      Right Ear: Tympanic membrane normal.      Left Ear: Tympanic membrane normal.      Ears:      Comments: Hearing aids.  Right TM nonerythematous, distorted, baseline.     Nose: Nose normal.      Mouth/Throat:      Mouth: Mucous membranes are moist.      Pharynx: Oropharynx is clear.   Eyes:      Conjunctiva/sclera: Conjunctivae normal.      Pupils: Pupils are equal, round, and reactive to light.   Cardiovascular:      Rate and Rhythm: Normal rate and regular rhythm.      Pulses: Normal pulses.      Heart sounds: Normal heart sounds.   Pulmonary:      Effort: Pulmonary effort is normal.      Breath sounds: Normal breath sounds.   Abdominal:      General: Abdomen is flat. Bowel sounds are normal.      Palpations: Abdomen is soft.   Musculoskeletal:         General: Normal range of motion.      Cervical back: Normal range of motion and neck supple.   Skin:     General: Skin is warm.      Capillary Refill: Capillary refill takes less than 2 seconds.   Neurological:      General: No focal deficit present.      Mental Status: He is alert and oriented to person, place, and time.   Psychiatric:         Mood and Affect: Mood normal.         Assessment:       1. Hypertension, unspecified type     2. Deafness, unspecified laterality    3. Osteoarthritis, unspecified osteoarthritis type, unspecified site    4. Hyperlipidemia, unspecified hyperlipidemia type          Plan:       Hypertension, unspecified type    Deafness, unspecified laterality    Osteoarthritis, unspecified osteoarthritis type, unspecified site    Hyperlipidemia, unspecified hyperlipidemia type    Other orders  -     ibuprofen (ADVIL,MOTRIN) 800 MG tablet; Take 1 tablet (800 mg total) by mouth daily as needed for Pain (severe oa pain).  Dispense: 60 tablet; Refill: 1     Discussed his extensive vitamin regimen, he is recommended to cut down vit D to 1000-4000IU, says he takes 10,000U daily, along with multivitamin.  Continue to monitor his BP, bring cuff in periodically to confirm accurate readings.  Take Ibuprofen only when really needed.  Followup 3 mo or PRN

## 2023-04-11 ENCOUNTER — PATIENT MESSAGE (OUTPATIENT)
Dept: ADMINISTRATIVE | Facility: HOSPITAL | Age: 68
End: 2023-04-11
Payer: COMMERCIAL

## 2023-07-21 RX ORDER — CARVEDILOL 3.12 MG/1
TABLET ORAL
Qty: 180 TABLET | Refills: 1 | Status: SHIPPED | OUTPATIENT
Start: 2023-07-21

## 2023-07-21 NOTE — TELEPHONE ENCOUNTER
Last office visit: 2/22/2023  Rx Auth Request  Received: [facility] Morales Staff  Caller: Unspecified (Today, 12:18 AM)

## 2023-08-23 ENCOUNTER — OFFICE VISIT (OUTPATIENT)
Dept: PODIATRY | Facility: CLINIC | Age: 68
End: 2023-08-23
Payer: COMMERCIAL

## 2023-08-23 ENCOUNTER — TELEPHONE (OUTPATIENT)
Dept: PODIATRY | Facility: CLINIC | Age: 68
End: 2023-08-23

## 2023-08-23 VITALS
WEIGHT: 220 LBS | RESPIRATION RATE: 16 BRPM | BODY MASS INDEX: 31.5 KG/M2 | HEIGHT: 70 IN | HEART RATE: 69 BPM | SYSTOLIC BLOOD PRESSURE: 172 MMHG | DIASTOLIC BLOOD PRESSURE: 91 MMHG

## 2023-08-23 DIAGNOSIS — B35.1 ONYCHOMYCOSIS OF TOENAIL: ICD-10-CM

## 2023-08-23 DIAGNOSIS — L60.8 ACQUIRED DYSMORPHIC TOENAIL: ICD-10-CM

## 2023-08-23 DIAGNOSIS — M25.571 PAIN, JOINT, FOOT, RIGHT: ICD-10-CM

## 2023-08-23 DIAGNOSIS — L60.0 INGROWN NAIL OF GREAT TOE OF RIGHT FOOT: Primary | ICD-10-CM

## 2023-08-23 PROCEDURE — 99999 PR PBB SHADOW E&M-EST. PATIENT-LVL IV: CPT | Mod: PBBFAC,,, | Performed by: PODIATRIST

## 2023-08-23 PROCEDURE — 99213 PR OFFICE/OUTPT VISIT, EST, LEVL III, 20-29 MIN: ICD-10-PCS | Mod: S$GLB,,, | Performed by: PODIATRIST

## 2023-08-23 PROCEDURE — 99213 OFFICE O/P EST LOW 20 MIN: CPT | Mod: S$GLB,,, | Performed by: PODIATRIST

## 2023-08-23 PROCEDURE — 99999 PR PBB SHADOW E&M-EST. PATIENT-LVL IV: ICD-10-PCS | Mod: PBBFAC,,, | Performed by: PODIATRIST

## 2023-08-23 RX ORDER — DICLOFENAC SODIUM 30 MG/G
GEL TOPICAL
Qty: 100 G | Refills: 4 | Status: SHIPPED | OUTPATIENT
Start: 2023-08-23

## 2023-08-23 NOTE — PROGRESS NOTES
Subjective:       Patient ID: Kade Ames is a 68 y.o. male.    Chief Complaint: Follow-up, Nail Problem, and Toe Pain  Patient presents for follow-up chronically painful nail right great toe.  Patient has had problems with this nail due to fungal infection, thickness, discoloration for quite a few years.  Tried multiple topical treatments including over-the-counter and prescription clotrimazole topical solution.  When we last saw him it was just following a total nail avulsion which was our last option, he had taken 90 days of terbinafine/Lamisil prior to that.  There was no improvement with oral medication patient relates his toe did great for a very long time following removal until recently.  At this point he is feeling pressure in his shoes all the time.  Would had like to have it removed again.  Pain level 5/10  Patient also inquires about a refill for diclofenac 3% which he has been using for joint pain right great toe      Past Medical History:   Diagnosis Date    Deaf     reads lips    Diabetes mellitus, type 2      History reviewed. No pertinent surgical history.  History reviewed. No pertinent family history.  Social History     Socioeconomic History    Marital status:    Tobacco Use    Smoking status: Never    Smokeless tobacco: Never   Substance and Sexual Activity    Alcohol use: Never    Drug use: Never    Sexual activity: Not Currently       Current Outpatient Medications   Medication Sig Dispense Refill    aspirin 325 MG tablet Take 325 mg by mouth once daily.      carvediloL (COREG) 3.125 MG tablet TAKE 1 TABLET BY MOUTH 2 TIMES DAILY. 180 tablet 1    ibuprofen (ADVIL,MOTRIN) 800 MG tablet Take 1 tablet (800 mg total) by mouth daily as needed for Pain (severe oa pain). 60 tablet 1    losartan (COZAAR) 25 MG tablet Take 1 tablet (25 mg total) by mouth 2 (two) times a day. 180 tablet 3    diclofenac sodium (SOLARAZE) 3 % gel Apply to affected area 3 times a day for joint, muscle pain 100 g 4  "   diclofenac sodium (VOLTAREN) 1 % Gel Apply 2 g topically 2 (two) times daily as needed (arthritis pain). 50 g 5     No current facility-administered medications for this visit.     Review of patient's allergies indicates:  No Known Allergies    Review of Systems   HENT:  Positive for hearing loss.    Musculoskeletal:  Negative for gait problem.   All other systems reviewed and are negative.      Objective:      Vitals:    08/23/23 0912   BP: (!) 172/91   Pulse: 69   Resp: 16   Weight: 99.8 kg (220 lb)   Height: 5' 10" (1.778 m)     Physical Exam  Vitals and nursing note reviewed.   Constitutional:       General: He is not in acute distress.     Appearance: Normal appearance.   Cardiovascular:      Pulses:           Dorsalis pedis pulses are 2+ on the right side and 2+ on the left side.        Posterior tibial pulses are 2+ on the right side and 2+ on the left side.   Pulmonary:      Effort: Pulmonary effort is normal.   Musculoskeletal:      Right foot: Decreased range of motion (limited ROM 1st MPJ right). No deformity.      Left foot: No deformity.   Feet:      Right foot:      Skin integrity: No erythema (Tender dystrophic thick and ingrown imbedded nail distally right hallux with onychomycosis, no infection) or dry skin.      Toenail Condition: Right toenails are abnormally thick and ingrown. Fungal disease present.     Left foot:      Skin integrity: No dry skin (Onychomycosis third digit left foot).      Toenail Condition: Fungal disease present.  Skin:     Capillary Refill: Capillary refill takes 2 to 3 seconds.   Neurological:      General: No focal deficit present.      Mental Status: He is alert.   Psychiatric:         Behavior: Behavior normal.         Thought Content: Thought content normal.              Assessment:       1. Ingrown nail of great toe of right foot    2. Acquired dysmorphic toenail    3. Onychomycosis of toenail    4. Pain, joint, foot, right          Plan:           Reviewed treatment " options, conservative, nail avulsion, nail avulsion with application of medicine to try to prevent the nail from growing back.  We discussed pros and cons which each of these treatments and advised patient with any procedure to remove the nail there is always the possibility of recurrence  Patient was in understanding and agreement with treatment plan, did want to pursue just a regular total nail avulsion of the right great toe but did not want to have it done today.  Scheduled procedure for next week  Ingrown/imbedded right hallux nail was debrided in thickness reduced along with 3rd digit left foot.  No areas of infection or concern at this time.  Prescribed diclofenac 3% topical cream, apply as directed to affected areas feet  Patient was in understanding and agreement with treatment plan.  I counseled the patient on their conditions, implications and medical management.  Instructed patient to contact the office with any changes, questions, concerns, worsening of symptoms.   Total face to face time 20 minutes, exam, assessment, treatment, discussion, additional time for review of chart prior to and following appointment and visit documentation, consultation and coordination of care.   Follow up 1 week    This note was created using M*[employer] voice recognition software that occasionally misinterpreted phrases or words.

## 2023-08-23 NOTE — TELEPHONE ENCOUNTER
----- Message from Frank Young sent at 8/23/2023  9:49 AM CDT -----  Regarding: Appointment Needs Scheduled  Patient seen Dr. Stone at the [facility] location on August 23rd. Patient came up to schedule a week follow up to have their nail removed. However, I do not have access to schedule this particular appointment as they were told to come in before 9:00am (around 8:30am) next Wednesday at [facility].    Their contact information is his son : [phone]

## 2023-08-30 ENCOUNTER — OFFICE VISIT (OUTPATIENT)
Dept: PODIATRY | Facility: CLINIC | Age: 68
End: 2023-08-30
Payer: COMMERCIAL

## 2023-08-30 VITALS
HEIGHT: 70 IN | HEART RATE: 80 BPM | BODY MASS INDEX: 31.5 KG/M2 | WEIGHT: 220 LBS | RESPIRATION RATE: 16 BRPM | DIASTOLIC BLOOD PRESSURE: 91 MMHG | SYSTOLIC BLOOD PRESSURE: 168 MMHG

## 2023-08-30 DIAGNOSIS — B35.1 ONYCHOMYCOSIS OF TOENAIL: ICD-10-CM

## 2023-08-30 DIAGNOSIS — L60.8 ACQUIRED DYSMORPHIC TOENAIL: ICD-10-CM

## 2023-08-30 DIAGNOSIS — L60.0 INGROWN NAIL OF GREAT TOE OF RIGHT FOOT: Primary | ICD-10-CM

## 2023-08-30 PROCEDURE — 99213 OFFICE O/P EST LOW 20 MIN: CPT | Mod: 25,S$GLB,, | Performed by: PODIATRIST

## 2023-08-30 PROCEDURE — 99999 PR PBB SHADOW E&M-EST. PATIENT-LVL IV: ICD-10-PCS | Mod: PBBFAC,,, | Performed by: PODIATRIST

## 2023-08-30 PROCEDURE — 11730 NAIL REMOVAL: ICD-10-PCS | Mod: T5,S$GLB,, | Performed by: PODIATRIST

## 2023-08-30 PROCEDURE — 99999 PR PBB SHADOW E&M-EST. PATIENT-LVL IV: CPT | Mod: PBBFAC,,, | Performed by: PODIATRIST

## 2023-08-30 PROCEDURE — 99213 PR OFFICE/OUTPT VISIT, EST, LEVL III, 20-29 MIN: ICD-10-PCS | Mod: 25,S$GLB,, | Performed by: PODIATRIST

## 2023-08-30 PROCEDURE — 11730 AVULSION NAIL PLATE SIMPLE 1: CPT | Mod: T5,S$GLB,, | Performed by: PODIATRIST

## 2023-08-30 NOTE — PROGRESS NOTES
Subjective:       Patient ID: Kade Ames is a 68 y.o. male.    Chief Complaint: Follow-up, Ingrown Toenail, and Toe Pain  Patient presents with his brother for total nail avulsion discussed last visit.  Has had chronic issues with this nails over the last 3 4 years.  Has had this done in the past with good results. Declines matrixectomy which was discussed in detail last visit.  Patient relates due to pain over the last 6 months he would like to have the total nail removed.  Has pain on a daily basis with closed toed shoes pushing on this area.  Pain level 3/10    Past Medical History:   Diagnosis Date    Deaf     reads lips    Diabetes mellitus, type 2      History reviewed. No pertinent surgical history.  History reviewed. No pertinent family history.  Social History     Socioeconomic History    Marital status:    Tobacco Use    Smoking status: Never    Smokeless tobacco: Never   Substance and Sexual Activity    Alcohol use: Never    Drug use: Never    Sexual activity: Not Currently       Current Outpatient Medications   Medication Sig Dispense Refill    aspirin 325 MG tablet Take 325 mg by mouth once daily.      carvediloL (COREG) 3.125 MG tablet TAKE 1 TABLET BY MOUTH 2 TIMES DAILY. 180 tablet 1    diclofenac sodium (SOLARAZE) 3 % gel Apply to affected area 3 times a day for joint, muscle pain 100 g 4    diclofenac sodium (VOLTAREN) 1 % Gel Apply 2 g topically 2 (two) times daily as needed (arthritis pain). 50 g 5    ibuprofen (ADVIL,MOTRIN) 800 MG tablet Take 1 tablet (800 mg total) by mouth daily as needed for Pain (severe oa pain). 60 tablet 1    losartan (COZAAR) 25 MG tablet Take 1 tablet (25 mg total) by mouth 2 (two) times a day. 180 tablet 3     No current facility-administered medications for this visit.     Review of patient's allergies indicates:  No Known Allergies    Review of Systems   HENT:  Positive for hearing loss.    Musculoskeletal:  Negative for gait problem.   All other systems  "reviewed and are negative.      Objective:      Vitals:    08/30/23 0832   BP: (!) 168/91   Pulse: 80   Resp: 16   Weight: 99.8 kg (220 lb)   Height: 5' 10" (1.778 m)     Physical Exam  Vitals and nursing note reviewed. Exam conducted with a chaperone present.   Constitutional:       General: He is not in acute distress.     Appearance: Normal appearance.   Cardiovascular:      Pulses:           Dorsalis pedis pulses are 2+ on the right side and 2+ on the left side.        Posterior tibial pulses are 2+ on the right side and 2+ on the left side.   Pulmonary:      Effort: Pulmonary effort is normal.   Musculoskeletal:         General: Tenderness present.      Right foot: Decreased range of motion (limited ROM 1st MPJ right). No deformity.      Left foot: No deformity.      Comments: Pain right hallux nail plate, 3/10   Feet:      Right foot:      Skin integrity: No erythema (painful ingrown dystrophic imbedded nail distally right hallux with onychomycosis, no infection).      Toenail Condition: Right toenails are abnormally thick and ingrown. Fungal disease present.  Skin:     Capillary Refill: Capillary refill takes 2 to 3 seconds.   Neurological:      General: No focal deficit present.      Mental Status: He is alert.   Psychiatric:         Behavior: Behavior normal.         Thought Content: Thought content normal.              Assessment:       1. Ingrown nail of great toe of right foot    2. Acquired dysmorphic toenail          Plan:         TOTAL NAIL AVULSION RIGHT HALLUX    Reviewed extent of damage to the nail, fungal involvement and chronic ingrown nail with patient.  We discussed nail avulsion procedure, care of area afterwards and had a lengthy discussion regarding topical treatments to try to prevent recurrence.  Following daily treatments/soaking to care for area after procedure today we discussed treatment of the nail bed, tea tree oil, soaking a few times a week, how to maintain nail to prevent " buildup, raised, tented nail which is causing his ingrown toenail.  These things would need to be done on a weekly basis over a 6-12 month period of time  Reviewed signs of infection to monitor for  Patient was in understanding and agreement with treatment plan  Total nail avulsion performed right hallux  Patient tolerated well  Reviewed and wrote down home going instructions  I counseled the patient on their conditions, implications and medical management.  Instructed patient to contact the office with any changes, questions, concerns, worsening of symptoms.   Total face to face time 20 minutes, exam, assessment, treatment, discussion, additional time for review of chart prior to and following appointment and visit documentation, consultation and coordination of care.  Additional time required for total nail avulsion right hallux  Follow up as needed    This note was created using M*[employer] voice recognition software that occasionally misinterpreted phrases or words.

## 2023-08-31 NOTE — PROCEDURES
Nail Removal    Date/Time: 8/30/2023 8:50 AM    Performed by: Sonal Stone DPM  Authorized by: Sonal Stone DPM    Consent Done?:  Yes (Written)  Location:     Location:  Right foot    Location detail:  Right big toe  Anesthesia:     Anesthesia:  Digital block    Local anesthetic:  Topical anesthetic, lidocaine 1% without epinephrine and bupivacaine 0.5% without epinephrine    Anesthetic total (ml):  5 (2.5mL each)  Procedure Details:     Preparation:  Skin prepped with alcohol    Amount removed:  Complete    Wedge excision of skin of nail fold: No      Nail bed sutured?: No      Nail matrix removed:  None    Dressing applied:  Antibiotic ointment (telfa, gauze, coban)    Patient tolerance:  Patient tolerated the procedure well with no immediate complications     Reviewed home going instructions